# Patient Record
Sex: FEMALE | Race: WHITE | URBAN - METROPOLITAN AREA
[De-identification: names, ages, dates, MRNs, and addresses within clinical notes are randomized per-mention and may not be internally consistent; named-entity substitution may affect disease eponyms.]

---

## 2017-07-31 ENCOUNTER — APPOINTMENT (RX ONLY)
Dept: URBAN - METROPOLITAN AREA CLINIC 41 | Facility: CLINIC | Age: 52
Setting detail: DERMATOLOGY
End: 2017-07-31

## 2017-07-31 DIAGNOSIS — Z41.9 ENCOUNTER FOR PROCEDURE FOR PURPOSES OTHER THAN REMEDYING HEALTH STATE, UNSPECIFIED: ICD-10-CM

## 2017-07-31 PROCEDURE — ? BOTOX

## 2017-07-31 NOTE — PROCEDURE: BOTOX
Forehead Units: 0
Detail Level: Detailed
Expiration Date (Month Year): 10/2019
Dilution (U/0.1 Cc): 1
Lot #: [id number]
Consent: We discussed the risks and benefits of botox including but not limited to bruising, muscle weakness, lid or brow ptosis, double vision, facial weakness, headaches, procedural pain, temporary benefit only. Patient understands that treatment with botox only lessens dynamic wrinkles on a temporary basis, usually for 2-3 months.  Aware variability in patient response and that no guarantee of length of benefit can be made.  All patient's questions were answered fully and to patient satisfaction. Patient should not lie down for 6 hours after injections, and should not travel by airplane nor exercise for 24-48 hours. Patient understands that the treatment is cosmetic in nature and not covered by insurance.
Glabellar Complex Units: 20
Post-Care Instructions: see face map for injection sites.

## 2019-01-15 ENCOUNTER — APPOINTMENT (RX ONLY)
Dept: URBAN - METROPOLITAN AREA CLINIC 2 | Facility: CLINIC | Age: 54
Setting detail: DERMATOLOGY
End: 2019-01-15

## 2019-01-15 DIAGNOSIS — Z41.9 ENCOUNTER FOR PROCEDURE FOR PURPOSES OTHER THAN REMEDYING HEALTH STATE, UNSPECIFIED: ICD-10-CM

## 2019-01-15 PROCEDURE — ? BOTOX

## 2019-01-15 NOTE — PROCEDURE: BOTOX
Periorbital Skin Units: 0
Consent: Written consent obtained. Risks include but not limited to lid/brow ptosis, bruising, swelling, diplopia, temporary effect, incomplete chemical denervation.
Post-Care Instructions: Patient instructed to not lie down for 4 hours and limit physical activity for 24 hours. Patient instructed not to travel by airplane for 48 hours.
Additional Area 1 Location: glabella, lateral brow, periorbital
Additional Area 1 Units: 25
Expiration Date (Month Year): 11/2020
Detail Level: Zone
Lot #: [id number]
Dilution (U/0.1 Cc): 1

## 2019-05-23 ENCOUNTER — APPOINTMENT (RX ONLY)
Dept: URBAN - METROPOLITAN AREA CLINIC 41 | Facility: CLINIC | Age: 54
Setting detail: DERMATOLOGY
End: 2019-05-23

## 2019-05-23 DIAGNOSIS — Z41.9 ENCOUNTER FOR PROCEDURE FOR PURPOSES OTHER THAN REMEDYING HEALTH STATE, UNSPECIFIED: ICD-10-CM

## 2019-05-23 PROCEDURE — ? BOTOX

## 2019-05-23 NOTE — PROCEDURE: BOTOX
Glabellar Complex Units: 0
Post-Care Instructions: Patient instructed to not lie down for 4 hours and limit physical activity for 24 hours. Patient instructed not to travel by airplane for 48 hours.
Expiration Date (Month Year): 09/2021
Dilution (U/0.1 Cc): 1
Consent: Written consent obtained. Risks include but not limited to lid/brow ptosis, bruising, swelling, diplopia, temporary effect, incomplete chemical denervation.
Detail Level: Zone
Lot #: [id number]
Additional Area 1 Units: 25
Additional Area 1 Location: glabella, lateral brow, periorbital

## 2021-06-16 ENCOUNTER — APPOINTMENT (RX ONLY)
Dept: URBAN - METROPOLITAN AREA CLINIC 2 | Facility: CLINIC | Age: 56
Setting detail: DERMATOLOGY
End: 2021-06-16

## 2021-06-16 DIAGNOSIS — Z41.9 ENCOUNTER FOR PROCEDURE FOR PURPOSES OTHER THAN REMEDYING HEALTH STATE, UNSPECIFIED: ICD-10-CM

## 2021-06-16 PROCEDURE — ? BOTOX

## 2021-06-16 PROCEDURE — ? COSMETIC CONSULTATION: FILLERS

## 2021-06-16 NOTE — PROCEDURE: BOTOX
Lcl Root Units: 0
Detail Level: Zone
Expiration Date (Month Year): 09/2023
Consent: Written consent obtained. Risks include but not limited to lid/brow ptosis, bruising, swelling, diplopia, temporary effect, incomplete chemical denervation.
Show Topical Anesthesia: Yes
Show Mentalis Units: No
Lot #: [id number]
Additional Area 1 Units: 25
Post-Care Instructions: Patient instructed to not lie down for 4 hours and limit physical activity for 24 hours. Patient instructed not to travel by airplane for 48 hours.
Dilution (U/0.1 Cc): 1
Additional Area 1 Location: see drawing